# Patient Record
Sex: FEMALE | Race: ASIAN | NOT HISPANIC OR LATINO | URBAN - METROPOLITAN AREA
[De-identification: names, ages, dates, MRNs, and addresses within clinical notes are randomized per-mention and may not be internally consistent; named-entity substitution may affect disease eponyms.]

---

## 2019-08-11 ENCOUNTER — EMERGENCY (EMERGENCY)
Facility: HOSPITAL | Age: 54
LOS: 1 days | Discharge: HOME | End: 2019-08-11
Admitting: EMERGENCY MEDICINE
Payer: MEDICAID

## 2019-08-11 VITALS
HEART RATE: 96 BPM | TEMPERATURE: 98 F | RESPIRATION RATE: 16 BRPM | OXYGEN SATURATION: 100 % | SYSTOLIC BLOOD PRESSURE: 149 MMHG | DIASTOLIC BLOOD PRESSURE: 87 MMHG

## 2019-08-11 PROCEDURE — 73030 X-RAY EXAM OF SHOULDER: CPT | Mod: 26,RT

## 2019-08-11 PROCEDURE — 99283 EMERGENCY DEPT VISIT LOW MDM: CPT

## 2019-08-11 RX ORDER — CYCLOBENZAPRINE HYDROCHLORIDE 10 MG/1
10 TABLET, FILM COATED ORAL ONCE
Refills: 0 | Status: COMPLETED | OUTPATIENT
Start: 2019-08-11 | End: 2019-08-11

## 2019-08-11 RX ORDER — DICLOFENAC SODIUM 75 MG/1
1 TABLET, DELAYED RELEASE ORAL
Qty: 10 | Refills: 0
Start: 2019-08-11 | End: 2019-08-15

## 2019-08-11 RX ADMIN — CYCLOBENZAPRINE HYDROCHLORIDE 10 MILLIGRAM(S): 10 TABLET, FILM COATED ORAL at 15:09

## 2019-08-11 NOTE — ED PROVIDER NOTE - MUSCULOSKELETAL MINIMAL EXAM
right shoulder: no swelling or obvious deformity. full rom without pain. + mild tenderess to plpation right scapular area.  + mild pain to right scapular region with resisted  rom. sensations intact. ms 5/5 bl arms hands.

## 2019-08-11 NOTE — ED PROVIDER NOTE - CLINICAL SUMMARY MEDICAL DECISION MAKING FREE TEXT BOX
53 y/o female DM2 c/o right shoulder/scapular pain x 5 days  -probable muscle strain/radicular pain  -flexeril/xray  -outpt ortho follow up

## 2019-08-11 NOTE — ED ADULT TRIAGE NOTE - CHIEF COMPLAINT QUOTE
pt here for right sided upper back pain radiating to the right arm with numbness/ tingling  x 4 days. pt was seen at  and given "Liquid motrin shot" with no relief. fs [id number]. pmhx IDDM

## 2019-08-11 NOTE — ED PROVIDER NOTE - OBJECTIVE STATEMENT
53 y/o female hx DM2 presents to ED c/o right shoulder pain x 5 days. Pt. states she alsways sleeps on her right shoulder - states she woke up on her right side in an awkward position wednesday morning to her phone ringing and moved quickly to try and grab it - she states she felt sudden immense pain to her right scapular area with pain radiating down her right arm. Pt. states since then has been experiencig pain to shoulder and scapular area worse with movement as well as some numbness tingling from shoulder to elbow intermittently. Pt. states that she went to urgent care on thursday and was given "motrin injection" and felt better as well as motrin pills and states that when she takes the motrin it makes the pain go away but then it wears off and pain comes back at times. Denies chest pain shortness of breath palpitations nausea vomit weakness dizziness slurred speech or any other complaints.   Currently states asymptomatic bc she took motrin at 1030.